# Patient Record
Sex: MALE | Race: BLACK OR AFRICAN AMERICAN | Employment: UNEMPLOYED | URBAN - METROPOLITAN AREA
[De-identification: names, ages, dates, MRNs, and addresses within clinical notes are randomized per-mention and may not be internally consistent; named-entity substitution may affect disease eponyms.]

---

## 2020-07-22 ENCOUNTER — TELEPHONE (OUTPATIENT)
Dept: FAMILY MEDICINE CLINIC | Age: 60
End: 2020-07-22

## 2020-07-22 NOTE — TELEPHONE ENCOUNTER
----- Message from Pauly Gomez sent at 7/21/2020 10:10 AM EDT -----  Regarding: Dr. Peter Jordan Agbeibor/Telephone  Contact: [phone]  Caller's first and last name and relationship to patient (if not the patient): n/a  Best contact number: [phone]  Preferred date and time: Next available  Scheduled appointment date and time: n/a  Reason for appointment: Hernia \"mass\" surgery to hold it down. Details to clarify the request: It is hurting so he would like it done as soon as possible. His doctor, Dr. Russell Harper, referred him to another place but that didn't take his insurance, so his insurance company referred him to this specific doctor to do the procedure.